# Patient Record
Sex: FEMALE | Race: WHITE | NOT HISPANIC OR LATINO | Employment: OTHER | URBAN - METROPOLITAN AREA
[De-identification: names, ages, dates, MRNs, and addresses within clinical notes are randomized per-mention and may not be internally consistent; named-entity substitution may affect disease eponyms.]

---

## 2023-08-28 PROBLEM — C20 RECTAL CANCER (MULTI): Status: ACTIVE | Noted: 2023-08-28

## 2023-08-28 PROBLEM — N95.1 MENOPAUSAL STATE: Status: ACTIVE | Noted: 2023-08-28

## 2023-08-28 PROBLEM — Z93.3 COLOSTOMY PRESENT (MULTI): Status: ACTIVE | Noted: 2023-08-28

## 2023-08-28 PROBLEM — H52.00 HYPEROPIA: Status: ACTIVE | Noted: 2023-08-28

## 2023-08-28 PROBLEM — N60.99 BENIGN MAMMARY DYSPLASIA: Status: ACTIVE | Noted: 2023-08-28

## 2023-08-28 PROBLEM — D72.819 LEUKOPENIA: Status: ACTIVE | Noted: 2023-08-28

## 2023-08-28 PROBLEM — M81.0 OSTEOPOROSIS: Status: ACTIVE | Noted: 2023-08-28

## 2023-08-28 PROBLEM — N60.99 ATYPICAL LOBULAR HYPERPLASIA OF BREAST: Status: ACTIVE | Noted: 2023-08-28

## 2023-08-28 PROBLEM — H25.10 NUCLEAR SCLEROSIS: Status: ACTIVE | Noted: 2023-08-28

## 2023-08-28 PROBLEM — M17.9 OSTEOARTHRITIS OF KNEE: Status: ACTIVE | Noted: 2023-08-28

## 2023-08-28 PROBLEM — R91.1 SOLITARY PULMONARY NODULE: Status: ACTIVE | Noted: 2023-08-28

## 2023-08-28 PROBLEM — N95.1 MENOPAUSAL SYMPTOM: Status: ACTIVE | Noted: 2023-08-28

## 2023-08-28 PROBLEM — R10.2 PERINEAL PAIN IN FEMALE: Status: ACTIVE | Noted: 2023-08-28

## 2023-08-28 PROBLEM — H61.23 BILATERAL IMPACTED CERUMEN: Status: ACTIVE | Noted: 2023-08-28

## 2023-08-28 PROBLEM — S32.409A ACETABULAR FRACTURE (MULTI): Status: ACTIVE | Noted: 2023-08-28

## 2023-08-28 PROBLEM — R92.0 MAMMOGRAPHIC MICROCALCIFICATION: Status: ACTIVE | Noted: 2023-08-28

## 2023-08-28 PROBLEM — K56.600 PARTIAL OBSTRUCTION OF SMALL INTESTINE (MULTI): Status: ACTIVE | Noted: 2023-08-28

## 2023-08-28 PROBLEM — H52.4 PRESBYOPIA OF BOTH EYES: Status: ACTIVE | Noted: 2023-08-28

## 2023-08-28 PROBLEM — K62.1 RECTAL POLYP: Status: ACTIVE | Noted: 2023-08-28

## 2023-08-28 PROBLEM — K21.9 GASTROESOPHAGEAL REFLUX DISEASE: Status: ACTIVE | Noted: 2023-08-28

## 2023-08-28 PROBLEM — E78.5 HYPERLIPIDEMIA: Status: ACTIVE | Noted: 2023-08-28

## 2023-08-28 PROBLEM — E55.9 VITAMIN D DEFICIENCY: Status: ACTIVE | Noted: 2023-08-28

## 2023-08-28 PROBLEM — N95.0 POSTMENOPAUSAL BLEEDING: Status: ACTIVE | Noted: 2023-08-28

## 2023-08-28 RX ORDER — ALENDRONATE SODIUM 70 MG/1
TABLET ORAL
COMMUNITY
Start: 2021-11-15 | End: 2023-11-02 | Stop reason: SDUPTHER

## 2023-08-28 RX ORDER — CHOLECALCIFEROL (VITAMIN D3) 25 MCG
1 TABLET ORAL DAILY
COMMUNITY

## 2023-08-28 RX ORDER — ACETAMINOPHEN 500 MG
2 TABLET ORAL DAILY
COMMUNITY

## 2023-08-28 RX ORDER — ACETAMINOPHEN 325 MG/1
TABLET ORAL EVERY 6 HOURS
COMMUNITY
Start: 2022-06-22

## 2023-08-28 RX ORDER — FAMOTIDINE 20 MG/1
2 TABLET, FILM COATED ORAL DAILY
COMMUNITY

## 2023-10-11 NOTE — PROGRESS NOTES
Subjective   Patient ID: Debby Quintanilla is a 73 y.o. female who presents for No chief complaint on file..  HPI  Patient was last in on 23 for a wound check, at the time she was no longer able to pack it and was advised to put dry gauze over it. She is here today for her 2 week wound check.     Past Medical History:   Diagnosis Date    Atypical lobular hyperplasia (ALH) of right breast     Bowel obstruction (CMS/HCC)     Colostomy status (CMS/HCC)     Colostomy in place    Endometriosis     GERD (gastroesophageal reflux disease)     Other specified health status     No known health problems    Personal history of other malignant neoplasm of large intestine     History of malignant neoplasm of colon    Rectal cancer (CMS/HCC)       Past Surgical History:   Procedure Laterality Date    ABDOMINAL PERINEAL BOWEL RESECTION W/ ILEOANAL POUCH  2016    Abdominal Perineal Resection    BI MAMMO GUIDED LOCALIZATION BREAST RIGHT Right 12/15/2016    BI MAMMO GUIDED LOCALIZATION BREAST RIGHT LAK SURG AIB LEGACY    BI STEREOTACTIC GUIDED BREAST LOCALIZATION AND BIOPSY RIGHT Right 2016    BI STEREOTACTIC GUIDED BREAST LOCALIZATION AND BIOPSY RIGHT LAK CLINICAL LEGACY     SECTION, CLASSIC  2014     Section    COLONOSCOPY  2005    COLONOSCOPY      DILATION AND CURETTAGE OF UTERUS      EXPLORATORY LAPAROTOMY  2023    Exploratory Laparoscopy - Repair of Small Bowel Enterotomy    FOOT SURGERY  2014    Foot Surgery    HIP SURGERY      Right Hip Surgery    KNEE ARTHROSCOPY W/ MENISCAL REPAIR  2014    Knee Arthroscopy With Lateral Meniscus Repair    REVISION / TAKEDOWN COLOSTOMY  2023    Takedown Splenic Flexure, Evacuation of a Large Serosanguineous Fluid Collection in Pelvis and 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      Family History   Problem Relation Name Age of Onset    Breast cancer Mother      Colon cancer Mother      Cancer Father      Other (sepsis)  Sister      Parkinsonism Brother      Diabetes Son        No Known Allergies   Review of Systems    Objective   Physical Exam  Incision clean dry and intact.  No further drainage.  Parastomal hernia intact.    Assessment/Plan   Problem List Items Addressed This Visit             ICD-10-CM    Non-healing surgical wound - Primary T81.89XA     Patient with resolution of open wound.  No further drainage.  Epithelialized nicely.  No signs of recurrent hernia.  Patient to follow-up as needed

## 2023-10-13 ENCOUNTER — OFFICE VISIT (OUTPATIENT)
Dept: SURGERY | Facility: CLINIC | Age: 73
End: 2023-10-13
Payer: MEDICARE

## 2023-10-13 VITALS
HEIGHT: 63 IN | BODY MASS INDEX: 19.84 KG/M2 | WEIGHT: 112 LBS | RESPIRATION RATE: 20 BRPM | SYSTOLIC BLOOD PRESSURE: 125 MMHG | TEMPERATURE: 99.3 F | DIASTOLIC BLOOD PRESSURE: 74 MMHG | OXYGEN SATURATION: 99 % | HEART RATE: 98 BPM

## 2023-10-13 DIAGNOSIS — T81.89XD NON-HEALING SURGICAL WOUND, SUBSEQUENT ENCOUNTER: Primary | ICD-10-CM

## 2023-10-13 PROBLEM — T81.89XA NON-HEALING SURGICAL WOUND: Status: ACTIVE | Noted: 2023-10-13

## 2023-10-13 PROCEDURE — 99024 POSTOP FOLLOW-UP VISIT: CPT | Performed by: SURGERY

## 2023-10-13 PROCEDURE — 1126F AMNT PAIN NOTED NONE PRSNT: CPT | Performed by: SURGERY

## 2023-10-13 PROCEDURE — 1159F MED LIST DOCD IN RCRD: CPT | Performed by: SURGERY

## 2023-10-13 PROCEDURE — 1036F TOBACCO NON-USER: CPT | Performed by: SURGERY

## 2023-10-13 ASSESSMENT — PAIN SCALES - GENERAL: PAINLEVEL: 0-NO PAIN

## 2023-10-13 NOTE — ASSESSMENT & PLAN NOTE
Patient with resolution of open wound.  No further drainage.  Epithelialized nicely.  No signs of recurrent hernia.  Patient to follow-up as needed

## 2023-10-31 ASSESSMENT — ENCOUNTER SYMPTOMS
APPETITE CHANGE: 0
CHILLS: 0
DIARRHEA: 0
HEADACHES: 0
SHORTNESS OF BREATH: 0
FEVER: 0
ABDOMINAL PAIN: 0
VOMITING: 0
COUGH: 0
NAUSEA: 0

## 2023-10-31 NOTE — PROGRESS NOTES
[facility]: [facility]  MEDICARE WELLNESS EXAM      Debby Quintanilla is a 73 y.o. female that is presenting today for Annual Exam (Medicare annual exam).    Assessment/Plan    Diagnoses and all orders for this visit:  Annual physical exam  History of rectal cancer  Gastroesophageal reflux disease without esophagitis  Hyperlipidemia, unspecified hyperlipidemia type  -     CBC; Future  -     Comprehensive Metabolic Panel; Future  -     Lipid Panel; Future  -     TSH with reflex to Free T4 if abnormal; Future  Osteoporosis, unspecified osteoporosis type, unspecified pathological fracture presence  -     alendronate (Fosamax) 70 mg tablet; Take 1 tablet (70 mg) by mouth every 7 days. 1 tablet 30 minutes before the first food, beverage or medicine of the day with plain water Orally Once per week  Vitamin D deficiency  -     Vitamin D 25-Hydroxy,Total (for eval of Vitamin D levels); Future  Leukopenia, unspecified type  Encounter for routine laboratory testing  -     CBC; Future  -     Comprehensive Metabolic Panel; Future  -     Lipid Panel; Future  -     TSH with reflex to Free T4 if abnormal; Future  -     Vitamin D 25-Hydroxy,Total (for eval of Vitamin D levels); Future  -     Hemoglobin A1C; Future  Mixed hyperlipidemia  -     Lipid Panel; Future  Hyperglycemia  -     Hemoglobin A1C; Future  Encounter for screening mammogram for breast cancer  -     BI mammo bilateral screening tomosynthesis; Future  Encounter for screening for osteoporosis  -     XR DEXA bone density; Future  Osteoporosis, idiopathic  -     XR DEXA bone density; Future  Other orders  -     Follow Up In Primary Care - Medicare Annual; Future  -     Pneumococcal conjugate vaccine, 20-valent (PREVNAR 20)    ADVANCED CARE PLANNING  Advanced Care Planning was discussed with patient:  The patient has an active advanced care plan on file. The patient has an active surrogate decision-maker on file.  The patient was encouraged to  ensure that any/all documentation is accurate and up to date, and that our office be provided a copy in the event that anything changes.    ACTIVITIES OF DAILY LIVING  Basic ADLs:  Bathing: Independent, Dressing: Independent, Toileting: Independent, Transferring: Independent, Continence: Independent, Feeding: Independent.    Instrumental ADLs:  Ability to use phone: Independent, Shopping: Independent, Cooking: Independent, House-keeping: Independent, Laundry: Independent, Transportation: Independent, Medication Management: Independent, Finance Management: Independent.    Subjective   This patient presents today for annual physical, Medicare wellness exam.  Discussed screening/prevention, healthy lifestyle and code status.   Reviewed the patient's wishing regarding decision making.  Had bowel obstruction this year.  Has been seeing Dr. Sellers after SBO, ex lap, hernia repair, nonhealing wound now much better.      Review of Systems   Constitutional:  Negative for appetite change, chills and fever.   Respiratory:  Negative for cough and shortness of breath.    Cardiovascular:  Negative for chest pain.   Gastrointestinal:  Negative for abdominal pain, diarrhea, nausea and vomiting.   Neurological:  Negative for headaches.   All other systems reviewed and are negative.    Objective   Vitals:    11/02/23 1035   BP: 125/70   Pulse: 96   SpO2: 99%      Body mass index is 19.74 kg/m².  Physical Exam  Vitals reviewed.   Constitutional:       General: She is not in acute distress.     Appearance: She is not toxic-appearing.   HENT:      Head: Normocephalic and atraumatic.      Mouth/Throat:      Mouth: Mucous membranes are moist.   Eyes:      Pupils: Pupils are equal, round, and reactive to light.   Cardiovascular:      Rate and Rhythm: Normal rate and regular rhythm.      Heart sounds: No murmur heard.  Pulmonary:      Breath sounds: Normal breath sounds. No wheezing, rhonchi or rales.   Abdominal:      General: There is  "no distension.      Palpations: Abdomen is soft.   Musculoskeletal:      Right lower leg: No edema.      Left lower leg: No edema.   Neurological:      General: No focal deficit present.      Mental Status: She is alert and oriented to person, place, and time.       Diagnostic Results   Lab Results   Component Value Date    GLUCOSE 101 (H) 09/16/2023    CALCIUM 9.1 09/16/2023     09/16/2023    K 5.0 09/16/2023    CO2 23 (L) 09/16/2023     09/16/2023    BUN 10 09/16/2023    CREATININE 0.6 09/16/2023     Lab Results   Component Value Date    ALT 20 09/16/2023    AST 39 09/16/2023    ALKPHOS 354 (H) 09/16/2023    BILITOT 0.3 09/16/2023     Lab Results   Component Value Date    WBC 8.7 09/16/2023    HGB 8.8 (L) 09/16/2023    HCT 27.8 (L) 09/16/2023    MCV 94.2 09/16/2023     (H) 09/16/2023     Lab Results   Component Value Date    CHOL 77 (L) 09/12/2023    CHOL 237 (H) 10/31/2022    CHOL 264 (H) 10/23/2021     Lab Results   Component Value Date    HDL 20 (L) 09/12/2023    HDL 64 10/31/2022    HDL 58 10/23/2021     Lab Results   Component Value Date    LDLCALC 38 (L) 09/12/2023    LDLCALC 154 (H) 10/31/2022    LDLCALC 188 (H) 10/23/2021     Lab Results   Component Value Date    TRIG 96 09/12/2023    TRIG 95 10/31/2022    TRIG 90 10/23/2021     No components found for: \"CHOLHDL\"  No results found for: \"HGBA1C\"  Other labs not included in the list above reviewed either before or during this encounter.    History   Past Medical History:   Diagnosis Date    Atypical lobular hyperplasia (ALH) of right breast     Bowel obstruction (CMS/HCC)     Colostomy status (CMS/HCC)     Colostomy in place    Endometriosis     GERD (gastroesophageal reflux disease)     Other specified health status     No known health problems    Personal history of other malignant neoplasm of large intestine     History of malignant neoplasm of colon    Rectal cancer (CMS/HCC) 2015     Past Surgical History:   Procedure Laterality Date "    ABDOMINAL PERINEAL BOWEL RESECTION W/ ILEOANAL POUCH      Abdominal Perineal Resection    BI MAMMO GUIDED LOCALIZATION BREAST RIGHT Right 12/15/2016    BI MAMMO GUIDED LOCALIZATION BREAST RIGHT LAK SURG AIB LEGACY    BI STEREOTACTIC GUIDED BREAST LOCALIZATION AND BIOPSY RIGHT Right 2016    BI STEREOTACTIC GUIDED BREAST LOCALIZATION AND BIOPSY RIGHT LAK CLINICAL LEGACY     SECTION, CLASSIC  2014     Section    COLONOSCOPY  2005    COLONOSCOPY      DILATION AND CURETTAGE OF UTERUS      EXPLORATORY LAPAROTOMY  2023    Exploratory Laparoscopy - Repair of Small Bowel Enterotomy    FOOT SURGERY  2014    Foot Surgery    HIP SURGERY      Right Hip Surgery    KNEE ARTHROSCOPY W/ MENISCAL REPAIR  2014    Knee Arthroscopy With Lateral Meniscus Repair    REVISION / TAKEDOWN COLOSTOMY  2023    Takedown Splenic Flexure, Evacuation of a Large Serosanguineous Fluid Collection in Pelvis and 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     Family History   Problem Relation Name Age of Onset    Breast cancer Mother      Colon cancer Mother      Cancer Father      Other (sepsis) Sister      Parkinsonism Brother      Diabetes Son       Social History     Socioeconomic History    Marital status:      Spouse name: Not on file    Number of children: Not on file    Years of education: Not on file    Highest education level: Not on file   Occupational History    Not on file   Tobacco Use    Smoking status: Never    Smokeless tobacco: Never   Substance and Sexual Activity    Alcohol use: Yes    Drug use: Never    Sexual activity: Defer   Other Topics Concern    Not on file   Social History Narrative    Not on file     Social Determinants of Health     Financial Resource Strain: Not on file   Food Insecurity: Not on file   Transportation Needs: Not on file   Physical Activity: Not on file   Stress: Not on file   Social Connections: Not on file   Intimate Partner Violence:  Not on file   Housing Stability: Not on file     No Known Allergies  Current Outpatient Medications on File Prior to Visit   Medication Sig Dispense Refill    calcium carbonate (Oscal) 500 mg calcium (1,250 mg) tablet Take 2 tablets (2,500 mg) by mouth once daily.      famotidine (Pepcid) 20 mg tablet Take 2 tablets (40 mg) by mouth once daily.      L. acidophilus/Bifid. animalis 15.5 billion cell capsule Take 1 capsule by mouth once daily.      MULTIVITAMIN ORAL Take by mouth once daily.      wheat dextrin (BENEFIBER HEALTHY SHAPE ORAL) Orally      [DISCONTINUED] alendronate (Fosamax) 70 mg tablet 1 tablet 30 minutes before the first food, beverage or medicine of the day with plain water Orally Once per week      acetaminophen (TylenoL) 325 mg tablet Take by mouth every 6 hours. TAKE 1 TO 2 TABLETS EVERY 6 HOURS AS NEEDED.      calcium carbonate-vitamin D3 600 mg-20 mcg (800 unit) tablet Take 2 tablets by mouth once daily.      CALCIUM ORAL Take 1 tablet by mouth once daily.      cholecalciferol (Vitamin D3) 25 MCG (1000 UT) tablet Take 1 tablet (25 mcg) by mouth once daily.      glucosamine/chondr che A sod (OSTEO BI-FLEX ORAL) Take 1 tablet by mouth once daily.      prochlorperazine maleate (COMPAZINE ORAL) Take 1 tablet by mouth if needed.       No current facility-administered medications on file prior to visit.     Immunization History   Administered Date(s) Administered    Pfizer COVID-19 vaccine, bivalent, age 12 years and older (30 mcg/0.3 mL) 10/05/2022    Pfizer Purple Cap SARS-CoV-2 03/24/2021, 04/23/2021, 11/05/2021    Pneumococcal conjugate vaccine, 20-valent (PREVNAR 20) 11/02/2023    Pneumococcal polysaccharide vaccine, 23-valent, age 2 years and older (PNEUMOVAX 23) 03/01/2017    Zoster vaccine, recombinant, adult (SHINGRIX) 10/23/2019, 12/19/2019     Patient's medical history was reviewed and updated either before or during this encounter.    He Isidro MD

## 2023-11-02 ENCOUNTER — OFFICE VISIT (OUTPATIENT)
Dept: PRIMARY CARE | Facility: CLINIC | Age: 73
End: 2023-11-02
Payer: MEDICARE

## 2023-11-02 VITALS
HEART RATE: 96 BPM | HEIGHT: 64 IN | WEIGHT: 115 LBS | OXYGEN SATURATION: 99 % | SYSTOLIC BLOOD PRESSURE: 125 MMHG | DIASTOLIC BLOOD PRESSURE: 70 MMHG | BODY MASS INDEX: 19.63 KG/M2

## 2023-11-02 DIAGNOSIS — D72.819 LEUKOPENIA, UNSPECIFIED TYPE: ICD-10-CM

## 2023-11-02 DIAGNOSIS — Z00.00 ANNUAL PHYSICAL EXAM: Primary | ICD-10-CM

## 2023-11-02 DIAGNOSIS — Z01.89 ENCOUNTER FOR ROUTINE LABORATORY TESTING: ICD-10-CM

## 2023-11-02 DIAGNOSIS — Z12.31 ENCOUNTER FOR SCREENING MAMMOGRAM FOR BREAST CANCER: ICD-10-CM

## 2023-11-02 DIAGNOSIS — E78.5 HYPERLIPIDEMIA, UNSPECIFIED HYPERLIPIDEMIA TYPE: ICD-10-CM

## 2023-11-02 DIAGNOSIS — E55.9 VITAMIN D DEFICIENCY: ICD-10-CM

## 2023-11-02 DIAGNOSIS — M81.0 OSTEOPOROSIS, UNSPECIFIED OSTEOPOROSIS TYPE, UNSPECIFIED PATHOLOGICAL FRACTURE PRESENCE: ICD-10-CM

## 2023-11-02 DIAGNOSIS — K21.9 GASTROESOPHAGEAL REFLUX DISEASE WITHOUT ESOPHAGITIS: ICD-10-CM

## 2023-11-02 DIAGNOSIS — Z85.048 HISTORY OF RECTAL CANCER: ICD-10-CM

## 2023-11-02 DIAGNOSIS — M81.8 OSTEOPOROSIS, IDIOPATHIC: ICD-10-CM

## 2023-11-02 DIAGNOSIS — E78.2 MIXED HYPERLIPIDEMIA: ICD-10-CM

## 2023-11-02 DIAGNOSIS — R73.9 HYPERGLYCEMIA: ICD-10-CM

## 2023-11-02 DIAGNOSIS — Z13.820 ENCOUNTER FOR SCREENING FOR OSTEOPOROSIS: ICD-10-CM

## 2023-11-02 PROCEDURE — G0009 ADMIN PNEUMOCOCCAL VACCINE: HCPCS | Performed by: INTERNAL MEDICINE

## 2023-11-02 PROCEDURE — 1036F TOBACCO NON-USER: CPT | Performed by: INTERNAL MEDICINE

## 2023-11-02 PROCEDURE — 1159F MED LIST DOCD IN RCRD: CPT | Performed by: INTERNAL MEDICINE

## 2023-11-02 PROCEDURE — 1126F AMNT PAIN NOTED NONE PRSNT: CPT | Performed by: INTERNAL MEDICINE

## 2023-11-02 PROCEDURE — G0439 PPPS, SUBSEQ VISIT: HCPCS | Performed by: INTERNAL MEDICINE

## 2023-11-02 RX ORDER — ALENDRONATE SODIUM 70 MG/1
70 TABLET ORAL
Qty: 12 TABLET | Refills: 3 | Status: SHIPPED | OUTPATIENT
Start: 2023-11-02 | End: 2024-11-01

## 2023-11-02 RX ORDER — CALCIUM CARBONATE 500(1250)
2 TABLET ORAL DAILY
COMMUNITY

## 2023-11-02 ASSESSMENT — ENCOUNTER SYMPTOMS
DEPRESSION: 0
LOSS OF SENSATION IN FEET: 0
OCCASIONAL FEELINGS OF UNSTEADINESS: 0

## 2023-11-02 ASSESSMENT — PATIENT HEALTH QUESTIONNAIRE - PHQ9
1. LITTLE INTEREST OR PLEASURE IN DOING THINGS: NOT AT ALL
SUM OF ALL RESPONSES TO PHQ9 QUESTIONS 1 AND 2: 0
2. FEELING DOWN, DEPRESSED OR HOPELESS: NOT AT ALL

## 2023-11-03 ENCOUNTER — LAB (OUTPATIENT)
Dept: LAB | Facility: LAB | Age: 73
End: 2023-11-03
Payer: MEDICARE

## 2023-11-03 DIAGNOSIS — E78.2 MIXED HYPERLIPIDEMIA: ICD-10-CM

## 2023-11-03 DIAGNOSIS — E78.5 HYPERLIPIDEMIA, UNSPECIFIED HYPERLIPIDEMIA TYPE: ICD-10-CM

## 2023-11-03 DIAGNOSIS — E55.9 VITAMIN D DEFICIENCY: ICD-10-CM

## 2023-11-03 DIAGNOSIS — Z01.89 ENCOUNTER FOR ROUTINE LABORATORY TESTING: ICD-10-CM

## 2023-11-03 DIAGNOSIS — R73.9 HYPERGLYCEMIA: ICD-10-CM

## 2023-11-03 LAB
25(OH)D3 SERPL-MCNC: 49 NG/ML (ref 31–100)
ALBUMIN SERPL-MCNC: 4 G/DL (ref 3.5–5)
ALP BLD-CCNC: 92 U/L (ref 35–125)
ALT SERPL-CCNC: 13 U/L (ref 5–40)
ANION GAP SERPL CALC-SCNC: 13 MMOL/L
AST SERPL-CCNC: 17 U/L (ref 5–40)
BILIRUB SERPL-MCNC: 0.4 MG/DL (ref 0.1–1.2)
BUN SERPL-MCNC: 18 MG/DL (ref 8–25)
CALCIUM SERPL-MCNC: 9.6 MG/DL (ref 8.5–10.4)
CHLORIDE SERPL-SCNC: 103 MMOL/L (ref 97–107)
CHOLEST SERPL-MCNC: 218 MG/DL (ref 133–200)
CHOLEST/HDLC SERPL: 4.1 {RATIO}
CO2 SERPL-SCNC: 26 MMOL/L (ref 24–31)
CREAT SERPL-MCNC: 0.7 MG/DL (ref 0.4–1.6)
EST. AVERAGE GLUCOSE BLD GHB EST-MCNC: 105 MG/DL
GFR SERPL CREATININE-BSD FRML MDRD: >90 ML/MIN/1.73M*2
GLUCOSE SERPL-MCNC: 107 MG/DL (ref 65–99)
HBA1C MFR BLD: 5.3 %
HDLC SERPL-MCNC: 53 MG/DL
LDLC SERPL CALC-MCNC: 140 MG/DL (ref 65–130)
POTASSIUM SERPL-SCNC: 4 MMOL/L (ref 3.4–5.1)
PROT SERPL-MCNC: 7.2 G/DL (ref 5.9–7.9)
SODIUM SERPL-SCNC: 142 MMOL/L (ref 133–145)
TRIGL SERPL-MCNC: 123 MG/DL (ref 40–150)
TSH SERPL DL<=0.05 MIU/L-ACNC: 1.19 MIU/L (ref 0.27–4.2)

## 2023-11-03 PROCEDURE — 82306 VITAMIN D 25 HYDROXY: CPT

## 2023-11-03 PROCEDURE — 80053 COMPREHEN METABOLIC PANEL: CPT

## 2023-11-03 PROCEDURE — 80061 LIPID PANEL: CPT

## 2023-11-03 PROCEDURE — 84443 ASSAY THYROID STIM HORMONE: CPT

## 2023-11-03 PROCEDURE — 83036 HEMOGLOBIN GLYCOSYLATED A1C: CPT

## 2023-11-03 PROCEDURE — 36415 COLL VENOUS BLD VENIPUNCTURE: CPT

## 2023-11-10 ENCOUNTER — ANCILLARY PROCEDURE (OUTPATIENT)
Dept: RADIOLOGY | Facility: CLINIC | Age: 73
End: 2023-11-10
Payer: MEDICARE

## 2023-11-10 VITALS — HEIGHT: 64 IN | BODY MASS INDEX: 19.63 KG/M2 | WEIGHT: 115 LBS

## 2023-11-10 DIAGNOSIS — Z13.820 ENCOUNTER FOR SCREENING FOR OSTEOPOROSIS: ICD-10-CM

## 2023-11-10 DIAGNOSIS — Z12.31 ENCOUNTER FOR SCREENING MAMMOGRAM FOR BREAST CANCER: ICD-10-CM

## 2023-11-10 DIAGNOSIS — M81.8 OSTEOPOROSIS, IDIOPATHIC: ICD-10-CM

## 2023-11-10 PROCEDURE — 77063 BREAST TOMOSYNTHESIS BI: CPT

## 2023-11-15 ENCOUNTER — ANCILLARY PROCEDURE (OUTPATIENT)
Dept: RADIOLOGY | Facility: CLINIC | Age: 73
End: 2023-11-15
Payer: MEDICARE

## 2023-11-15 DIAGNOSIS — Z13.820 ENCOUNTER FOR SCREENING FOR OSTEOPOROSIS: ICD-10-CM

## 2023-11-15 DIAGNOSIS — M81.8 OSTEOPOROSIS, IDIOPATHIC: ICD-10-CM

## 2023-11-15 PROCEDURE — 77085 DXA BONE DENSITY AXL VRT FX: CPT

## 2024-01-03 NOTE — PROGRESS NOTES
"MEDICARE ANNUAL  Subjective   Debby Quintanilla is a 73 y.o. female who is here for menopausal symptoms.    Complaints:  none  Periods: menopausal  Pain  none    HRT: no  History of abnormal Pap smear: no  History of abnormal mammogram: no        Review of Systems      Objective   /68   Ht 1.626 m (5' 4\")   Wt 52.6 kg (116 lb)   BMI 19.91 kg/m²        General:   Alert and oriented, in no acute distress   Neck: Supple. No visible thyromegaly.    Breast/Axilla: Normal to palpation bilaterally without masses, skin changes, or nipple discharge.    Abdomen: Soft, non-tender, without masses or organomegaly   Vulva: Normal architecture without erythema, masses, or lesions.    Vagina: Normal mucosa without lesions, masses, or atrophy. No abnormal vaginal discharge.    Cervix: Normal without masses, lesions, or signs of cervicitis.    Uterus: Normal mobile, non-enlarged uterus    Adnexa: Normal without masses or lesions   Pelvic Floor No POP noted. No high tone pelvic floor    Psych  Rectal Normal affect. Normal mood.   Normal stool, no masses, guaiac negative     Assessment/Plan   Diagnoses and all orders for this visit:  Screening for cancer of the rectum  Menopause      Mammogram-normal  DEXA-done    Tonja Johnson MD      "

## 2024-01-04 ENCOUNTER — OFFICE VISIT (OUTPATIENT)
Dept: OBSTETRICS AND GYNECOLOGY | Facility: CLINIC | Age: 74
End: 2024-01-04
Payer: MEDICARE

## 2024-01-04 VITALS
WEIGHT: 116 LBS | BODY MASS INDEX: 19.81 KG/M2 | HEIGHT: 64 IN | DIASTOLIC BLOOD PRESSURE: 68 MMHG | SYSTOLIC BLOOD PRESSURE: 148 MMHG

## 2024-01-04 DIAGNOSIS — Z78.0 MENOPAUSE: ICD-10-CM

## 2024-01-04 DIAGNOSIS — Z12.12 SCREENING FOR CANCER OF THE RECTUM: Primary | ICD-10-CM

## 2024-01-04 PROCEDURE — 1126F AMNT PAIN NOTED NONE PRSNT: CPT | Performed by: OBSTETRICS & GYNECOLOGY

## 2024-01-04 PROCEDURE — 99214 OFFICE O/P EST MOD 30 MIN: CPT | Performed by: OBSTETRICS & GYNECOLOGY

## 2024-01-04 PROCEDURE — 1036F TOBACCO NON-USER: CPT | Performed by: OBSTETRICS & GYNECOLOGY

## 2024-01-04 PROCEDURE — 1159F MED LIST DOCD IN RCRD: CPT | Performed by: OBSTETRICS & GYNECOLOGY

## 2024-01-04 ASSESSMENT — ENCOUNTER SYMPTOMS
LOSS OF SENSATION IN FEET: 0
OCCASIONAL FEELINGS OF UNSTEADINESS: 0
DEPRESSION: 0

## 2024-01-04 ASSESSMENT — LIFESTYLE VARIABLES
HOW OFTEN DO YOU HAVE A DRINK CONTAINING ALCOHOL: 2-4 TIMES A MONTH
HOW MANY STANDARD DRINKS CONTAINING ALCOHOL DO YOU HAVE ON A TYPICAL DAY: 1 OR 2

## 2024-01-04 ASSESSMENT — PATIENT HEALTH QUESTIONNAIRE - PHQ9
1. LITTLE INTEREST OR PLEASURE IN DOING THINGS: NOT AT ALL
2. FEELING DOWN, DEPRESSED OR HOPELESS: NOT AT ALL
SUM OF ALL RESPONSES TO PHQ9 QUESTIONS 1 & 2: 0

## 2024-10-30 ASSESSMENT — ENCOUNTER SYMPTOMS
COUGH: 0
CHILLS: 0
VOMITING: 0
DIARRHEA: 0
FEVER: 0
NAUSEA: 0
APPETITE CHANGE: 0
SHORTNESS OF BREATH: 0
ABDOMINAL PAIN: 0
HEADACHES: 0

## 2024-10-30 NOTE — PROGRESS NOTES
[facility]: [facility]  MEDICARE WELLNESS EXAM      Debby Quintanilla is a 74 y.o. female that is presenting today for medicare annual (Cpe as).    Assessment/Plan    Diagnoses and all orders for this visit:  Annual physical exam  History of rectal cancer  Hyperlipidemia, unspecified hyperlipidemia type  -     CBC and Auto Differential; Future  -     Comprehensive Metabolic Panel; Future  -     TSH with reflex to Free T4 if abnormal; Future  Gastroesophageal reflux disease without esophagitis  Mixed hyperlipidemia  -     Lipid Panel; Future  Hyperglycemia  -     Hemoglobin A1C; Future  Vitamin D deficiency  -     Vitamin D 25-Hydroxy,Total (for eval of Vitamin D levels); Future  Encounter for screening mammogram for breast cancer  -     BI mammo bilateral screening tomosynthesis; Future  Other orders  -     Follow Up In Primary Care - Medicare Annual  -     Tdap vaccine, age 7 years and older  (BOOSTRIX)  -     Follow Up In Primary Care - Medicare Annual; Future    ADVANCED CARE PLANNING  Advanced Care Planning was discussed with patient:  The patient has an active advanced care plan on file. The patient has an active surrogate decision-maker on file.  Encouraged the patient to confirm that Living Will and Healthcare Power of  (HCPoA) are accurate and up to date.  Encouraged the patient to confirm that our office be provided a copy of any documentation in the event that anything changes.    ACTIVITIES OF DAILY LIVING  Basic ADLs:  Bathing: Independent, Dressing: Independent, Toileting: Independent, Transferring: Independent, Continence: Independent, Feeding: Independent.    Instrumental ADLs:  Ability to use phone: Independent, Shopping: Independent, Cooking: Independent, House-keeping: Independent, Laundry: Independent, Transportation: Independent, Medication Management: Independent, Finance Management: Independent.    Subjective   HPI  This patient presents today for annual  physical, Medicare wellness exam.  Discussed screening/prevention, healthy lifestyle and code status.   Reviewed the patient's wishes regarding decision making.    Consultant visits and notes reviewed: Gyn Elizabeth, Surgery Anne Marie    Stable from a functional standpoint in regard to ADLs and IADLs.  No recent falls are reported.    The patient denies chest pain and shortness of breath.  No exertion-provoked or anginal-type symptoms are reported.    Review of Systems   Constitutional:  Negative for appetite change, chills and fever.   Respiratory:  Negative for cough and shortness of breath.    Cardiovascular:  Negative for chest pain.   Gastrointestinal:  Negative for abdominal pain, diarrhea, nausea and vomiting.   Neurological:  Negative for headaches.   All other systems reviewed and are negative.    Objective   Vitals:    11/04/24 1045   BP: 122/80   Pulse: 68   Temp: 36.2 °C (97.2 °F)   SpO2: 99%      Body mass index is 21.63 kg/m².  Physical Exam  Vitals reviewed.   Constitutional:       General: She is not in acute distress.     Appearance: She is not toxic-appearing.   HENT:      Head: Normocephalic and atraumatic.      Mouth/Throat:      Mouth: Mucous membranes are moist.   Eyes:      Pupils: Pupils are equal, round, and reactive to light.   Cardiovascular:      Rate and Rhythm: Normal rate and regular rhythm.      Heart sounds: No murmur heard.  Pulmonary:      Breath sounds: Normal breath sounds. No wheezing, rhonchi or rales.   Abdominal:      General: There is no distension.      Palpations: Abdomen is soft.   Musculoskeletal:      Right lower leg: No edema.      Left lower leg: No edema.   Neurological:      General: No focal deficit present.      Mental Status: She is alert and oriented to person, place, and time.       Diagnostic Results   Lab Results   Component Value Date    GLUCOSE 107 (H) 11/03/2023    CALCIUM 9.6 11/03/2023     11/03/2023    K 4.0 11/03/2023    CO2 26 11/03/2023      "2023    BUN 18 2023    CREATININE 0.70 2023     Lab Results   Component Value Date    ALT 13 2023    AST 17 2023    ALKPHOS 92 2023    BILITOT 0.4 2023     Lab Results   Component Value Date    WBC 8.7 2023    HGB 8.8 (L) 2023    HCT 27.8 (L) 2023    MCV 94.2 2023     (H) 2023     Lab Results   Component Value Date    CHOL 218 (H) 2023    CHOL 77 (L) 2023    CHOL 237 (H) 10/31/2022     Lab Results   Component Value Date    HDL 53.0 2023    HDL 20 (L) 2023    HDL 64 10/31/2022     Lab Results   Component Value Date    LDLCALC 140 (H) 2023    LDLCALC 38 (L) 2023    LDLCALC 154 (H) 10/31/2022     Lab Results   Component Value Date    TRIG 123 2023    TRIG 96 2023    TRIG 95 10/31/2022     No components found for: \"CHOLHDL\"  Lab Results   Component Value Date    HGBA1C 5.3 2023     Other labs not included in the list above reviewed either before or during this encounter.    History   Past Medical History:   Diagnosis Date    Atypical lobular hyperplasia (ALH) of right breast     Bowel obstruction (Multi)     Colostomy status (Multi)     Colostomy in place    Endometriosis     GERD (gastroesophageal reflux disease)     Other specified health status     No known health problems    Personal history of other malignant neoplasm of large intestine     History of malignant neoplasm of colon    Rectal cancer (Multi)      Past Surgical History:   Procedure Laterality Date    ABDOMINAL PERINEAL BOWEL RESECTION W/ ILEOANAL POUCH  2016    Abdominal Perineal Resection    BI MAMMO GUIDED BREAST RIGHT LOCALIZATION Right 12/15/2016    BI MAMMO GUIDED LOCALIZATION BREAST RIGHT LAK SURG AIB LEGACY    BI STEREOTACTIC GUIDED BREAST RIGHT LOCALIZATION AND BIOPSY Right 2016    BI STEREOTACTIC GUIDED BREAST LOCALIZATION AND BIOPSY RIGHT LAK CLINICAL LEGACY     SECTION, CLASSIC  2014    "  Section    COLONOSCOPY      COLONOSCOPY      DILATION AND CURETTAGE OF UTERUS      EXPLORATORY LAPAROTOMY  2023    Exploratory Laparoscopy - Repair of Small Bowel Enterotomy    FOOT SURGERY  2014    Foot Surgery    HIP SURGERY      Right Hip Surgery    KNEE ARTHROSCOPY W/ MENISCAL REPAIR  2014    Knee Arthroscopy With Lateral Meniscus Repair    REVISION / TAKEDOWN COLOSTOMY  2023    Takedown Splenic Flexure, Evacuation of a Large Serosanguineous Fluid Collection in Pelvis and 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     Family History   Problem Relation Name Age of Onset    Breast cancer Mother      Colon cancer Mother      Cancer Father      Other (sepsis) Sister      Parkinsonism Brother      Diabetes Son       Social History     Socioeconomic History    Marital status:      Spouse name: Not on file    Number of children: Not on file    Years of education: Not on file    Highest education level: Not on file   Occupational History    Not on file   Tobacco Use    Smoking status: Never    Smokeless tobacco: Never   Vaping Use    Vaping status: Never Used   Substance and Sexual Activity    Alcohol use: Yes    Drug use: Never    Sexual activity: Defer   Other Topics Concern    Not on file   Social History Narrative    Not on file     Social Drivers of Health     Financial Resource Strain: Not on file   Food Insecurity: Not on file   Transportation Needs: Not on file   Physical Activity: Not on file   Stress: Not on file   Social Connections: Not on file   Intimate Partner Violence: Not on file   Housing Stability: Not on file     No Known Allergies  Current Outpatient Medications on File Prior to Visit   Medication Sig Dispense Refill    acetaminophen (TylenoL) 325 mg tablet Take by mouth every 6 hours. TAKE 1 TO 2 TABLETS EVERY 6 HOURS AS NEEDED.      alendronate (Fosamax) 70 mg tablet Take 1 tablet (70 mg) by mouth every 7 days. 1 tablet 30 minutes before the  first food, beverage or medicine of the day with plain water Orally Once per week 12 tablet 3    calcium carbonate-vitamin D3 600 mg-20 mcg (800 unit) tablet Take 2 tablets by mouth once daily.      cholecalciferol (Vitamin D3) 25 MCG (1000 UT) tablet Take 1 tablet (25 mcg) by mouth once daily.      famotidine (Pepcid) 20 mg tablet Take 2 tablets (40 mg) by mouth once daily.      L. acidophilus/Bifid. animalis 15.5 billion cell capsule Take 1 capsule by mouth once daily.      MULTIVITAMIN ORAL Take by mouth once daily.      wheat dextrin (BENEFIBER HEALTHY SHAPE ORAL) Orally      [DISCONTINUED] calcium carbonate (Oscal) 500 mg calcium (1,250 mg) tablet Take 2 tablets (2,500 mg) by mouth once daily.      [DISCONTINUED] CALCIUM ORAL Take 1 tablet by mouth once daily.      [DISCONTINUED] glucosamine/chondr che A sod (OSTEO BI-FLEX ORAL) Take 1 tablet by mouth once daily.      [DISCONTINUED] prochlorperazine maleate (COMPAZINE ORAL) Take 1 tablet by mouth if needed.       No current facility-administered medications on file prior to visit.     Immunization History   Administered Date(s) Administered    Pfizer COVID-19 vaccine, 12 years and older, (30mcg/0.3mL) (Comirnaty) 11/15/2023, 10/03/2024    Pfizer COVID-19 vaccine, bivalent, age 12 years and older (30 mcg/0.3 mL) 10/05/2022    Pfizer Purple Cap SARS-CoV-2 03/24/2021, 04/23/2021, 11/05/2021    Pneumococcal conjugate vaccine, 20-valent (PREVNAR 20) 11/02/2023    Pneumococcal polysaccharide vaccine, 23-valent, age 2 years and older (PNEUMOVAX 23) 03/01/2017    Tdap vaccine, age 7 year and older (BOOSTRIX, ADACEL) 11/04/2024    Zoster vaccine, recombinant, adult (SHINGRIX) 10/23/2019, 12/19/2019     Patient's medical history was reviewed and updated either before or during this encounter.     He Isidro MD

## 2024-11-04 ENCOUNTER — OFFICE VISIT (OUTPATIENT)
Dept: PRIMARY CARE | Facility: CLINIC | Age: 74
End: 2024-11-04
Payer: MEDICARE

## 2024-11-04 VITALS
OXYGEN SATURATION: 99 % | HEIGHT: 64 IN | WEIGHT: 126 LBS | BODY MASS INDEX: 21.51 KG/M2 | SYSTOLIC BLOOD PRESSURE: 122 MMHG | TEMPERATURE: 97.2 F | DIASTOLIC BLOOD PRESSURE: 80 MMHG | HEART RATE: 68 BPM

## 2024-11-04 DIAGNOSIS — Z00.00 ANNUAL PHYSICAL EXAM: Primary | ICD-10-CM

## 2024-11-04 DIAGNOSIS — E78.2 MIXED HYPERLIPIDEMIA: ICD-10-CM

## 2024-11-04 DIAGNOSIS — E55.9 VITAMIN D DEFICIENCY: ICD-10-CM

## 2024-11-04 DIAGNOSIS — E78.5 HYPERLIPIDEMIA, UNSPECIFIED HYPERLIPIDEMIA TYPE: ICD-10-CM

## 2024-11-04 DIAGNOSIS — Z12.31 ENCOUNTER FOR SCREENING MAMMOGRAM FOR BREAST CANCER: ICD-10-CM

## 2024-11-04 DIAGNOSIS — R73.9 HYPERGLYCEMIA: ICD-10-CM

## 2024-11-04 DIAGNOSIS — K21.9 GASTROESOPHAGEAL REFLUX DISEASE WITHOUT ESOPHAGITIS: ICD-10-CM

## 2024-11-04 DIAGNOSIS — Z85.048 HISTORY OF RECTAL CANCER: ICD-10-CM

## 2024-11-04 PROCEDURE — 3008F BODY MASS INDEX DOCD: CPT | Performed by: INTERNAL MEDICINE

## 2024-11-04 PROCEDURE — G0439 PPPS, SUBSEQ VISIT: HCPCS | Performed by: INTERNAL MEDICINE

## 2024-11-04 PROCEDURE — 1126F AMNT PAIN NOTED NONE PRSNT: CPT | Performed by: INTERNAL MEDICINE

## 2024-11-04 PROCEDURE — 99215 OFFICE O/P EST HI 40 MIN: CPT | Mod: 25 | Performed by: INTERNAL MEDICINE

## 2024-11-04 PROCEDURE — 90715 TDAP VACCINE 7 YRS/> IM: CPT | Performed by: INTERNAL MEDICINE

## 2024-11-04 PROCEDURE — 1036F TOBACCO NON-USER: CPT | Performed by: INTERNAL MEDICINE

## 2024-11-04 PROCEDURE — 1159F MED LIST DOCD IN RCRD: CPT | Performed by: INTERNAL MEDICINE

## 2024-11-04 ASSESSMENT — ENCOUNTER SYMPTOMS
DEPRESSION: 0
OCCASIONAL FEELINGS OF UNSTEADINESS: 0
LOSS OF SENSATION IN FEET: 0

## 2024-11-04 ASSESSMENT — PATIENT HEALTH QUESTIONNAIRE - PHQ9
1. LITTLE INTEREST OR PLEASURE IN DOING THINGS: NOT AT ALL
2. FEELING DOWN, DEPRESSED OR HOPELESS: NOT AT ALL
SUM OF ALL RESPONSES TO PHQ9 QUESTIONS 1 AND 2: 0

## 2024-11-04 ASSESSMENT — PAIN SCALES - GENERAL: PAINLEVEL_OUTOF10: 0-NO PAIN

## 2024-11-07 ENCOUNTER — LAB (OUTPATIENT)
Dept: LAB | Facility: LAB | Age: 74
End: 2024-11-07
Payer: MEDICARE

## 2024-11-07 DIAGNOSIS — E78.2 MIXED HYPERLIPIDEMIA: ICD-10-CM

## 2024-11-07 DIAGNOSIS — E55.9 VITAMIN D DEFICIENCY: ICD-10-CM

## 2024-11-07 DIAGNOSIS — E78.5 HYPERLIPIDEMIA, UNSPECIFIED HYPERLIPIDEMIA TYPE: ICD-10-CM

## 2024-11-07 DIAGNOSIS — R73.9 HYPERGLYCEMIA: ICD-10-CM

## 2024-11-07 LAB
25(OH)D3 SERPL-MCNC: 36 NG/ML (ref 30–100)
ALBUMIN SERPL BCP-MCNC: 3.9 G/DL (ref 3.4–5)
ALP SERPL-CCNC: 54 U/L (ref 33–136)
ALT SERPL W P-5'-P-CCNC: 18 U/L (ref 7–45)
ANION GAP SERPL CALCULATED.3IONS-SCNC: 9 MMOL/L (ref 10–20)
AST SERPL W P-5'-P-CCNC: 18 U/L (ref 9–39)
BASOPHILS # BLD AUTO: 0.02 X10*3/UL (ref 0–0.1)
BASOPHILS NFR BLD AUTO: 0.9 %
BILIRUB SERPL-MCNC: 0.7 MG/DL (ref 0–1.2)
BUN SERPL-MCNC: 20 MG/DL (ref 6–23)
CALCIUM SERPL-MCNC: 9.5 MG/DL (ref 8.6–10.3)
CHLORIDE SERPL-SCNC: 105 MMOL/L (ref 98–107)
CHOLEST SERPL-MCNC: 259 MG/DL (ref 0–199)
CHOLEST/HDLC SERPL: 4.6 {RATIO}
CO2 SERPL-SCNC: 29 MMOL/L (ref 21–32)
CREAT SERPL-MCNC: 0.91 MG/DL (ref 0.5–1.05)
EGFRCR SERPLBLD CKD-EPI 2021: 67 ML/MIN/1.73M*2
EOSINOPHIL # BLD AUTO: 0.06 X10*3/UL (ref 0–0.4)
EOSINOPHIL NFR BLD AUTO: 2.8 %
ERYTHROCYTE [DISTWIDTH] IN BLOOD BY AUTOMATED COUNT: 13.7 % (ref 11.5–14.5)
EST. AVERAGE GLUCOSE BLD GHB EST-MCNC: 108 MG/DL
GLUCOSE SERPL-MCNC: 102 MG/DL (ref 74–99)
HBA1C MFR BLD: 5.4 %
HCT VFR BLD AUTO: 39.1 % (ref 36–46)
HDLC SERPL-MCNC: 55.7 MG/DL
HGB BLD-MCNC: 12.9 G/DL (ref 12–16)
IMM GRANULOCYTES # BLD AUTO: 0.01 X10*3/UL (ref 0–0.5)
IMM GRANULOCYTES NFR BLD AUTO: 0.5 % (ref 0–0.9)
LDLC SERPL CALC-MCNC: 179 MG/DL
LYMPHOCYTES # BLD AUTO: 0.75 X10*3/UL (ref 0.8–3)
LYMPHOCYTES NFR BLD AUTO: 35 %
MCH RBC QN AUTO: 30.6 PG (ref 26–34)
MCHC RBC AUTO-ENTMCNC: 33 G/DL (ref 32–36)
MCV RBC AUTO: 93 FL (ref 80–100)
MONOCYTES # BLD AUTO: 0.3 X10*3/UL (ref 0.05–0.8)
MONOCYTES NFR BLD AUTO: 14 %
NEUTROPHILS # BLD AUTO: 1 X10*3/UL (ref 1.6–5.5)
NEUTROPHILS NFR BLD AUTO: 46.8 %
NON HDL CHOLESTEROL: 203 MG/DL (ref 0–149)
NRBC BLD-RTO: 0 /100 WBCS (ref 0–0)
PLATELET # BLD AUTO: 235 X10*3/UL (ref 150–450)
POTASSIUM SERPL-SCNC: 4.2 MMOL/L (ref 3.5–5.3)
PROT SERPL-MCNC: 7.2 G/DL (ref 6.4–8.2)
RBC # BLD AUTO: 4.22 X10*6/UL (ref 4–5.2)
SODIUM SERPL-SCNC: 139 MMOL/L (ref 136–145)
TRIGL SERPL-MCNC: 123 MG/DL (ref 0–149)
TSH SERPL-ACNC: 0.95 MIU/L (ref 0.44–3.98)
VLDL: 25 MG/DL (ref 0–40)
WBC # BLD AUTO: 2.1 X10*3/UL (ref 4.4–11.3)

## 2024-11-07 PROCEDURE — 80061 LIPID PANEL: CPT

## 2024-11-07 PROCEDURE — 85025 COMPLETE CBC W/AUTO DIFF WBC: CPT

## 2024-11-07 PROCEDURE — 80053 COMPREHEN METABOLIC PANEL: CPT

## 2024-11-07 PROCEDURE — 84443 ASSAY THYROID STIM HORMONE: CPT

## 2024-11-07 PROCEDURE — 82306 VITAMIN D 25 HYDROXY: CPT

## 2024-11-07 PROCEDURE — 83036 HEMOGLOBIN GLYCOSYLATED A1C: CPT

## 2024-11-07 PROCEDURE — 36415 COLL VENOUS BLD VENIPUNCTURE: CPT

## 2024-11-08 DIAGNOSIS — D72.819 LEUKOPENIA, UNSPECIFIED TYPE: Primary | ICD-10-CM

## 2024-11-20 ENCOUNTER — HOSPITAL ENCOUNTER (OUTPATIENT)
Dept: RADIOLOGY | Facility: CLINIC | Age: 74
Discharge: HOME | End: 2024-11-20
Payer: MEDICARE

## 2024-11-20 VITALS — WEIGHT: 120 LBS | BODY MASS INDEX: 20.6 KG/M2

## 2024-11-20 DIAGNOSIS — Z12.31 ENCOUNTER FOR SCREENING MAMMOGRAM FOR BREAST CANCER: ICD-10-CM

## 2024-11-20 PROCEDURE — 77063 BREAST TOMOSYNTHESIS BI: CPT | Performed by: RADIOLOGY

## 2024-11-20 PROCEDURE — 77067 SCR MAMMO BI INCL CAD: CPT

## 2024-11-20 PROCEDURE — 77067 SCR MAMMO BI INCL CAD: CPT | Performed by: RADIOLOGY

## 2025-01-07 NOTE — PROGRESS NOTES
MEDICARE   Subjective   HPI:  Debby Quintanilla is a 74 y.o. female  No LMP recorded. Patient is postmenopausal. for menopausal complaints/routine exam.    Chief Complaint:   menopause  No current complaints  HRT:   no  Periods: menopausal  Pain:   no    GYNH:   Last pap    History of abnormal Pap smear:     Last mammogram   2024  History of abnormal mammogram:     DEXA  2023  Coloscopy   OB History          2    Para   2    Term   2            AB        Living   2         SAB        IAB        Ectopic        Multiple        Live Births                      Past Medical History:   Diagnosis Date    Atypical lobular hyperplasia (ALH) of right breast     Bowel obstruction (Multi)     Colostomy status (Multi)     Colostomy in place    Endometriosis     GERD (gastroesophageal reflux disease)     Other specified health status     No known health problems    Personal history of other malignant neoplasm of large intestine     History of malignant neoplasm of colon    Rectal cancer (Multi)        Past Surgical History:   Procedure Laterality Date    ABDOMINAL PERINEAL BOWEL RESECTION W/ ILEOANAL POUCH  2016    Abdominal Perineal Resection    BI MAMMO GUIDED BREAST RIGHT LOCALIZATION Right 12/15/2016    BI MAMMO GUIDED LOCALIZATION BREAST RIGHT LAK SURG [facility] LEGACY    BI STEREOTACTIC GUIDED BREAST RIGHT LOCALIZATION AND BIOPSY Right 2016    BI STEREOTACTIC GUIDED BREAST LOCALIZATION AND BIOPSY RIGHT LAK CLINICAL LEGACY     SECTION, CLASSIC  2014     Section    COLONOSCOPY  2005    COLONOSCOPY      DILATION AND CURETTAGE OF UTERUS      EXPLORATORY LAPAROTOMY  2023    Exploratory Laparoscopy - Repair of Small Bowel Enterotomy    FOOT SURGERY  2014    Foot Surgery    HIP SURGERY      Right Hip Surgery    KNEE ARTHROSCOPY W/ MENISCAL REPAIR  2014    Knee Arthroscopy With Lateral Meniscus Repair    REVISION / TAKEDOWN COLOSTOMY   "09/08/2023    Takedown Splenic Flexure, Evacuation of a Large Serosanguineous Fluid Collection in Pelvis and 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 Authorizing Provider   acetaminophen (TylenoL) 325 mg tablet Take by mouth every 6 hours. TAKE 1 TO 2 TABLETS EVERY 6 HOURS AS NEEDED. 6/22/22   Historical Provider, MD   alendronate (Fosamax) 70 mg tablet Take 1 tablet (70 mg) by mouth every 7 days. 1 tablet 30 minutes before the first food, beverage or medicine of the day with plain water Orally Once per week 11/2/23 11/4/24  He Isidro MD   calcium carbonate-vitamin D3 600 mg-20 mcg (800 unit) tablet Take 2 tablets by mouth once daily.    Historical Provider, MD   cholecalciferol (Vitamin D3) 25 MCG (1000 UT) tablet Take 1 tablet (25 mcg) by mouth once daily.    Historical Provider, MD   famotidine (Pepcid) 20 mg tablet Take 2 tablets (40 mg) by mouth once daily.    Historical Provider, MD   L. acidophilus/Bifid. animalis 15.5 billion cell capsule Take 1 capsule by mouth once daily.    Historical Provider, MD   MULTIVITAMIN ORAL Take by mouth once daily.    Historical Provider, MD   wheat dextrin (BENEFIBER HEALTHY SHAPE ORAL) Orally    Historical Provider, MD       Social History     Tobacco Use    Smoking status: Never    Smokeless tobacco: Never   Vaping Use    Vaping status: Never Used   Substance Use Topics    Alcohol use: Yes    Drug use: Never          Objective   /78   Ht 1.626 m (5' 4\")   Wt 57.5 kg (126 lb 12.8 oz)   BMI 21.77 kg/m²    General:   Alert and oriented, in no acute distress   Neck: Supple. No visible thyromegaly.    Breast/Axilla: Normal to palpation bilaterally without masses, skin changes, or nipple discharge.    Abdomen: Soft, non-tender, without masses or organomegaly   Vulva: Normal architecture without erythema, masses, or lesions.    Vagina: Normal mucosa without lesions, masses. Atrophy " present. No abnormal vaginal discharge.    Cervix: Normal without masses, lesions, or signs of cervicitis.    Uterus: Normal mobile, non-enlarged uterus    Adnexa: Normal without masses or lesions   Pelvic Floor No POP noted. No high tone pelvic floor    Psych  Rectal Normal affect. Normal mood.   Normal stool, no masses, guaiac negative     Assessment/Plan   Assessment & Plan  Menopause             OB/GYN Preventive:     - Pap smear indicated every 3-5 years if normal and otherwise low risk   - Self breast exam monthly and clinical breast examination/mammogram yearly   - Screening colonoscopy recommended starting age 45, then Q3-10 years depending on testing and family history   - Osteoporosis prevention discussion included vit D3/calcium supplements, weight-bearing exercise, DEXA 2023  - Genitourinary skin hygiene discussed.  - Diet/Weight management discussed.  - Exercise 30-60 minutes 3-5 times/day    oTnja Johnson MD

## 2025-01-09 ENCOUNTER — OFFICE VISIT (OUTPATIENT)
Dept: OBSTETRICS AND GYNECOLOGY | Facility: CLINIC | Age: 75
End: 2025-01-09
Payer: MEDICARE

## 2025-01-09 VITALS
WEIGHT: 126.8 LBS | DIASTOLIC BLOOD PRESSURE: 78 MMHG | BODY MASS INDEX: 21.65 KG/M2 | SYSTOLIC BLOOD PRESSURE: 132 MMHG | HEIGHT: 64 IN

## 2025-01-09 DIAGNOSIS — Z78.0 MENOPAUSE: Primary | ICD-10-CM

## 2025-01-09 PROCEDURE — 1160F RVW MEDS BY RX/DR IN RCRD: CPT | Performed by: OBSTETRICS & GYNECOLOGY

## 2025-01-09 PROCEDURE — 1036F TOBACCO NON-USER: CPT | Performed by: OBSTETRICS & GYNECOLOGY

## 2025-01-09 PROCEDURE — 99214 OFFICE O/P EST MOD 30 MIN: CPT | Performed by: OBSTETRICS & GYNECOLOGY

## 2025-01-09 PROCEDURE — 3008F BODY MASS INDEX DOCD: CPT | Performed by: OBSTETRICS & GYNECOLOGY

## 2025-01-09 PROCEDURE — 1126F AMNT PAIN NOTED NONE PRSNT: CPT | Performed by: OBSTETRICS & GYNECOLOGY

## 2025-01-09 PROCEDURE — 1159F MED LIST DOCD IN RCRD: CPT | Performed by: OBSTETRICS & GYNECOLOGY

## 2025-01-09 ASSESSMENT — LIFESTYLE VARIABLES
HOW OFTEN DO YOU HAVE SIX OR MORE DRINKS ON ONE OCCASION: NEVER
HOW MANY STANDARD DRINKS CONTAINING ALCOHOL DO YOU HAVE ON A TYPICAL DAY: 1 OR 2
AUDIT-C TOTAL SCORE: 1
SKIP TO QUESTIONS 9-10: 1
HOW OFTEN DO YOU HAVE A DRINK CONTAINING ALCOHOL: MONTHLY OR LESS

## 2025-01-09 ASSESSMENT — PATIENT HEALTH QUESTIONNAIRE - PHQ9
SUM OF ALL RESPONSES TO PHQ9 QUESTIONS 1 & 2: 0
2. FEELING DOWN, DEPRESSED OR HOPELESS: NOT AT ALL
1. LITTLE INTEREST OR PLEASURE IN DOING THINGS: NOT AT ALL

## 2025-01-09 ASSESSMENT — ENCOUNTER SYMPTOMS
LOSS OF SENSATION IN FEET: 0
OCCASIONAL FEELINGS OF UNSTEADINESS: 0
DEPRESSION: 0

## 2025-01-09 ASSESSMENT — PAIN SCALES - GENERAL: PAINLEVEL_OUTOF10: 0-NO PAIN

## 2025-07-04 ENCOUNTER — OFFICE VISIT (OUTPATIENT)
Dept: URGENT CARE | Age: 75
End: 2025-07-04
Payer: MEDICARE

## 2025-07-04 ENCOUNTER — ANCILLARY PROCEDURE (OUTPATIENT)
Dept: URGENT CARE | Age: 75
End: 2025-07-04
Payer: MEDICARE

## 2025-07-04 VITALS
TEMPERATURE: 98.9 F | SYSTOLIC BLOOD PRESSURE: 149 MMHG | OXYGEN SATURATION: 98 % | RESPIRATION RATE: 17 BRPM | BODY MASS INDEX: 21.46 KG/M2 | DIASTOLIC BLOOD PRESSURE: 79 MMHG | WEIGHT: 125 LBS | HEART RATE: 89 BPM

## 2025-07-04 DIAGNOSIS — R05.1 ACUTE COUGH: ICD-10-CM

## 2025-07-04 DIAGNOSIS — J18.9 PNEUMONIA OF RIGHT LOWER LOBE DUE TO INFECTIOUS ORGANISM: Primary | ICD-10-CM

## 2025-07-04 PROCEDURE — 1036F TOBACCO NON-USER: CPT | Performed by: PHYSICIAN ASSISTANT

## 2025-07-04 PROCEDURE — 99204 OFFICE O/P NEW MOD 45 MIN: CPT | Performed by: PHYSICIAN ASSISTANT

## 2025-07-04 PROCEDURE — 71046 X-RAY EXAM CHEST 2 VIEWS: CPT | Performed by: PHYSICIAN ASSISTANT

## 2025-07-04 PROCEDURE — 1159F MED LIST DOCD IN RCRD: CPT | Performed by: PHYSICIAN ASSISTANT

## 2025-07-04 RX ORDER — PREDNISONE 20 MG/1
TABLET ORAL
Qty: 10 TABLET | Refills: 0 | Status: SHIPPED | OUTPATIENT
Start: 2025-07-04

## 2025-07-04 RX ORDER — AMOXICILLIN AND CLAVULANATE POTASSIUM 875; 125 MG/1; MG/1
875 TABLET, FILM COATED ORAL 2 TIMES DAILY
Qty: 20 TABLET | Refills: 0 | Status: SHIPPED | OUTPATIENT
Start: 2025-07-04

## 2025-07-04 RX ORDER — ALBUTEROL SULFATE 90 UG/1
2 INHALANT RESPIRATORY (INHALATION) EVERY 6 HOURS PRN
Qty: 18 G | Refills: 0 | Status: SHIPPED | OUTPATIENT
Start: 2025-07-04 | End: 2026-07-04

## 2025-07-04 ASSESSMENT — ENCOUNTER SYMPTOMS
BACK PAIN: 1
COUGH: 1

## 2025-07-04 NOTE — PROGRESS NOTES
Subjective   Patient ID: Debby Quintanilla is a 75 y.o. female. They present today with a chief complaint of Cough and Back Pain (Pt states cough x 3 weeks. Now is having bilateral upper back pain).    History of Present Illness  Allergies: Reviewed.   Past medical history: Reviewed.   Past surgical history Reviewed.   Social history: Reviewed.    HPI: Patient is a very pleasant 75-year-old white female, past medical history of hyperlipidemia, presenting to the clinic for chief complaint of cough.  Patient is reporting approximately 3-week history of persistent cough that is productive of sputum.  She is unable to tell the color of her sputum as she does not frequently spit it out or look at it.  She denies any chest pain or shortness of breath.  She does report spasming coughing fits and states sometimes the cough is very spasmodic.  She denies any tobacco use or history of lung issues.  No fever or chills.  No abdominal pain, nausea, vomiting.  No numbness tingling or focal weakness.      Cough    Back Pain        Past Medical History  Allergies as of 07/04/2025    (No Known Allergies)       Prescriptions Prior to Admission[1]       Medical History[2]    Surgical History[3]     reports that she has never smoked. She has never used smokeless tobacco. She reports current alcohol use. She reports that she does not use drugs.    Review of Systems  Review of Systems   Respiratory:  Positive for cough.    Musculoskeletal:  Positive for back pain.                                  Objective    Vitals:    07/04/25 1007   BP: 149/79   BP Location: Left arm   Patient Position: Sitting   BP Cuff Size: Adult   Pulse: 89   Resp: 17   Temp: 37.2 °C (98.9 °F)   TempSrc: Oral   SpO2: 98%   Weight: 56.7 kg (125 lb)     No LMP recorded (lmp unknown). Patient is postmenopausal.    Physical Exam  Constitutional: Alert, oriented, cooperative, in no acute distress. Appears well nourished and well hydrated.    Head: Normocephalic,  atraumatic    Skin: Intact, dry skin. No lesions, rash, petechiae, or purpura.    Eyes: PERRL, EOMs intact, conjunctiva pink with no erythema or exudates. No scleral icterus. Eyelids without lesions.    ENT: Hearing grossly intact. No external deformities. Tympanic membranes intact with visible landmarks. Nares patent, mucus membranes moist. Dentition without lesions. Pharynx clear, uvula midline.    Neck: Trachea midline, no lymphadenopathy. No meningismus.     Pulmonary: Crackles are noted in the right lower lobe with no associated rhonchi or wheezing.  Lungs are otherwise clear to auscultation throughout with good chest wall excursion and good air exchange.  There is no accessory muscle use or stridor.     Cardiac: Regular rate and rhythm. Normal S1 and S2 with no murmur, rub, or gallop. No JVD, carotids without bruits. 2+ DP and PT pulses.     Abdomen: Soft, nontender, normoactive bowel sounds. No palpable organomegaly or mass. No rebound or guarding. No CVA tenderness.     Genitourinary: Exam deferred.    Musculoskeletal: Full range of motion in extremities. No pain, edema, or deformities. Peripheral pulses full and equal. No cyanosis, or clubbing.     Neurological: Cranial nerves II through XII are grossly intact. Normal sensation, no weakness, no focal findings identified.     Psychiatric: Appropriate mood and affect. Calm.  Procedures    Point of Care Test & Imaging Results from this visit    Imaging  No results found.    Cardiology, Vascular, and Other Imaging  No other imaging results found for the past 2 days      Diagnostic study results (if any) were reviewed by He Terry PA-C.    Assessment/Plan   Allergies, medications, history, and pertinent labs/EKGs/Imaging reviewed by He Terry PA-C.     Medical Decision Making  Patient was seen eval in the clinic for complaint of 3 weeks of a persistent cough.  On exam patient is nontoxic-appearing resting comfortably no acute distress.   Vital signs are stable, afebrile.  Heart is regular belly is soft and nontender.  I do appreciate some crackles in the right lung base with no associated wheezing or rhonchi.  Otherwise clear to auscultation throughout.  2 view chest x-ray was obtained which does not reveal any kin acute cardiopulmonary processes however I personally do appreciate some haziness in the right lung base and given the crackles noted on examination we did treat her for right lower lobe pneumonia with a 10-day course of Augmentin, albuterol Hailer, and 5 days of prednisone 40 mg daily.  Advise he follow-up with primary care physician next week.  Discharged home at this time.  Reviewed my impression, plan, strict return wrist report ED precautions with the patient.  She expresses understanding and agreement plan of care.      Orders and Diagnoses  Diagnoses and all orders for this visit:  Pneumonia of right lower lobe due to infectious organism  -     predniSONE (Deltasone) 20 mg tablet; Take two tablets per day for 5 days  -     amoxicillin-clavulanate (Augmentin) 875-125 mg tablet; Take 1 tablet by mouth 2 times a day.  -     albuterol 90 mcg/actuation inhaler; Inhale 2 puffs every 6 hours if needed for wheezing.  Acute cough  -     XR chest 2 views; Future        Medical Admin Record      Follow Up Instructions  No follow-ups on file.    Patient disposition: Home    Electronically signed by He Terry PA-C  10:35 AM         [1] (Not in a hospital admission)   [2]   Past Medical History:  Diagnosis Date    Atypical lobular hyperplasia (ALH) of right breast     Bowel obstruction (Multi)     Colostomy status (Multi)     Colostomy in place    Endometriosis     GERD (gastroesophageal reflux disease)     Other specified health status     No known health problems    Personal history of other malignant neoplasm of large intestine     History of malignant neoplasm of colon    Rectal cancer (Multi) 2015   [3]   Past Surgical  History:  Procedure Laterality Date    ABDOMINAL PERINEAL BOWEL RESECTION W/ ILEOANAL POUCH      Abdominal Perineal Resection    BI MAMMO GUIDED BREAST RIGHT LOCALIZATION Right 12/15/2016    BI MAMMO GUIDED LOCALIZATION BREAST RIGHT LAK SURG [facility]GAC[facility]    BI STEREOTACTIC GUIDED BREAST RIGHT LOCALIZATION AND BIOPSY Right 2016    BI STEREOTACTIC GUIDED BREAST LOCALIZATION AND BIOPSY RIGHT LAK CLINICAL LEGACY     SECTION, CLASSIC  2014     Section    COLONOSCOPY  2005    COLONOSCOPY      DILATION AND CURETTAGE OF UTERUS      EXPLORATORY LAPAROTOMY  2023    Exploratory Laparoscopy - Repair of Small Bowel Enterotomy    FOOT SURGERY  2014    Foot Surgery    HIP SURGERY      Right Hip Surgery    KNEE ARTHROSCOPY W/ MENISCAL REPAIR  2014    Knee Arthroscopy With Lateral Meniscus Repair    REVISION / TAKEDOWN COLOSTOMY  2023    Takedown Splenic Flexure, Evacuation of a Large Serosanguineous Fluid Collection in Pelvis and 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

## 2025-07-10 ENCOUNTER — ANCILLARY PROCEDURE (OUTPATIENT)
Dept: URGENT CARE | Age: 75
End: 2025-07-10
Payer: MEDICARE

## 2025-07-10 ENCOUNTER — OFFICE VISIT (OUTPATIENT)
Dept: URGENT CARE | Age: 75
End: 2025-07-10
Payer: MEDICARE

## 2025-07-10 VITALS
DIASTOLIC BLOOD PRESSURE: 80 MMHG | WEIGHT: 125 LBS | HEIGHT: 64 IN | RESPIRATION RATE: 16 BRPM | TEMPERATURE: 98.3 F | SYSTOLIC BLOOD PRESSURE: 169 MMHG | OXYGEN SATURATION: 99 % | BODY MASS INDEX: 21.34 KG/M2 | HEART RATE: 84 BPM

## 2025-07-10 DIAGNOSIS — R05.1 ACUTE COUGH: Primary | ICD-10-CM

## 2025-07-10 DIAGNOSIS — R05.1 ACUTE COUGH: ICD-10-CM

## 2025-07-10 PROCEDURE — 1125F AMNT PAIN NOTED PAIN PRSNT: CPT | Performed by: PHYSICIAN ASSISTANT

## 2025-07-10 PROCEDURE — 1036F TOBACCO NON-USER: CPT | Performed by: PHYSICIAN ASSISTANT

## 2025-07-10 PROCEDURE — 3008F BODY MASS INDEX DOCD: CPT | Performed by: PHYSICIAN ASSISTANT

## 2025-07-10 PROCEDURE — 1159F MED LIST DOCD IN RCRD: CPT | Performed by: PHYSICIAN ASSISTANT

## 2025-07-10 PROCEDURE — 71046 X-RAY EXAM CHEST 2 VIEWS: CPT | Performed by: PHYSICIAN ASSISTANT

## 2025-07-10 PROCEDURE — 99214 OFFICE O/P EST MOD 30 MIN: CPT | Performed by: PHYSICIAN ASSISTANT

## 2025-07-10 RX ORDER — METHYLPREDNISOLONE 4 MG/1
TABLET ORAL
Qty: 21 TABLET | Refills: 0 | Status: SHIPPED | OUTPATIENT
Start: 2025-07-10

## 2025-07-10 ASSESSMENT — ENCOUNTER SYMPTOMS
COUGH: 1
BACK PAIN: 1

## 2025-07-10 ASSESSMENT — PAIN SCALES - GENERAL: PAINLEVEL_OUTOF10: 8

## 2025-07-10 NOTE — PROGRESS NOTES
"Subjective   Patient ID: Debby Quintanilla is a 75 y.o. female. They present today with a chief complaint of Cough (Patient was diagnosed with pneumonia and states when she stopped the prednisone the cough and pain is coming back.) and Back Pain.    History of Present Illness  Allergies: Reviewed.   Past medical history: Reviewed.   Past surgical history Reviewed.   Social history: Reviewed.    HPI: Patient is a pleasant 36-year-old white female, past medical history of hyperlipidemia, presented to clinic with complaint of cough.  Patient was seen by the clinic 6 days ago diagnosed with pneumonia is currently on Augmentin albuterol inhaler and just finished her steroids.  She states after completion of her steroid she was feeling better however is reporting back to the clinic today stating her cough is worsening.  She is reporting that she is continuing her amoxicillin.  She is also reporting some left-sided mid back pain associated with her cough.  She denies any chest pain or shortness of breath.  No sputum production.  No fever or chills.  No upper respiratory congestion or rhinorrhea.      Cough    Back Pain        Past Medical History  Allergies as of 07/10/2025    (No Known Allergies)       Prescriptions Prior to Admission[1]       Medical History[2]    Surgical History[3]     reports that she has never smoked. She has never used smokeless tobacco. She reports current alcohol use. She reports that she does not use drugs.    Review of Systems  Review of Systems   Respiratory:  Positive for cough.    Musculoskeletal:  Positive for back pain.                                  Objective    Vitals:    07/10/25 0811   BP: 169/80   BP Location: Right arm   Patient Position: Sitting   Pulse: 84   Resp: 16   Temp: 36.8 °C (98.3 °F)   TempSrc: Oral   SpO2: 99%   Weight: 56.7 kg (125 lb)   Height: 1.626 m (5' 4\")     No LMP recorded (lmp unknown). Patient is postmenopausal.    Physical Exam  Constitutional: Alert, " oriented, cooperative, in no acute distress. Appears well nourished and well hydrated.    Head: Normocephalic, atraumatic    Skin: Intact, dry skin. No lesions, rash, petechiae, or purpura.    Eyes: PERRL, EOMs intact, conjunctiva pink with no erythema or exudates. No scleral icterus. Eyelids without lesions.    ENT: Hearing grossly intact. No external deformities. Tympanic membranes intact with visible landmarks. Nares patent, mucus membranes moist. Dentition without lesions. Pharynx clear, uvula midline.    Neck: Trachea midline, no lymphadenopathy. No meningismus.     Pulmonary: Lungs clear to auscultation bilaterally with good chest wall excursion. No rales, rhonchi, or wheezing. No accessory muscle use or stridor.     Cardiac: Regular rate and rhythm. Normal S1 and S2 with no murmur, rub, or gallop. No JVD, carotids without bruits. 2+ DP and PT pulses.     Abdomen: Soft, nontender, normoactive bowel sounds. No palpable organomegaly or mass. No rebound or guarding. No CVA tenderness.     Genitourinary: Exam deferred.    Musculoskeletal: Full range of motion in extremities. No pain, edema, or deformities. Peripheral pulses full and equal. No cyanosis, or clubbing.     Neurological: Cranial nerves II through XII are grossly intact. Normal sensation, no weakness, no focal findings identified.     Psychiatric: Appropriate mood and affect. Calm.  Procedures    Point of Care Test & Imaging Results from this visit    Imaging  No results found.    Cardiology, Vascular, and Other Imaging  No other imaging results found for the past 2 days      Diagnostic study results (if any) were reviewed by He Terry PA-C.    Assessment/Plan   Allergies, medications, history, and pertinent labs/EKGs/Imaging reviewed by He Terry PA-C.     Medical Decision Making  Patient presents to the clinic for chief complaint of persistent cough.  On exam patient is nontoxic well-appearing resting comfortably no acute  distress.  Vital signs are stable, afebrile.  Chest clear, heart is regular, belly is diffusely soft and nontender.  2 view chest x-ray was repeated today which does show improvement compared to chest x-ray 6 days ago.  Advised the patient to continue her Augmentin will place her on a Medrol Dosepak.  Advised albuterol inhaler as needed.  Advised Tylenol Profen as needed for her musculoskeletal back pain.  Advise she follow-up with her primary care physician in the next week.  Reviewed my impression, plan, strict return report ED precautions with the patient.  She expresses understanding and agreement plan of care.    Orders and Diagnoses  Diagnoses and all orders for this visit:  Acute cough  -     XR chest 2 views; Future  -     methylPREDNISolone (Medrol Dospak) 4 mg tablets; Take as directed on package.        Medical Admin Record      Follow Up Instructions  No follow-ups on file.    Patient disposition: Home    Electronically signed by He Terry PA-C  8:53 AM         [1] (Not in a hospital admission)   [2]   Past Medical History:  Diagnosis Date    Atypical lobular hyperplasia (ALH) of right breast     Bowel obstruction (Multi)     Colostomy status (Multi)     Colostomy in place    Endometriosis     GERD (gastroesophageal reflux disease)     Other specified health status     No known health problems    Personal history of other malignant neoplasm of large intestine     History of malignant neoplasm of colon    Rectal cancer (Multi)    [3]   Past Surgical History:  Procedure Laterality Date    ABDOMINAL PERINEAL BOWEL RESECTION W/ ILEOANAL POUCH  2016    Abdominal Perineal Resection    BI MAMMO GUIDED BREAST RIGHT LOCALIZATION Right 12/15/2016    BI MAMMO GUIDED LOCALIZATION BREAST RIGHT LAK SURG [facility] LEGACY    BI STEREOTACTIC GUIDED BREAST RIGHT LOCALIZATION AND BIOPSY Right 2016    BI STEREOTACTIC GUIDED BREAST LOCALIZATION AND BIOPSY RIGHT LAK CLINICAL LEGACY     SECTION, CLASSIC   2014     Section    COLONOSCOPY  2005    COLONOSCOPY      DILATION AND CURETTAGE OF UTERUS      EXPLORATORY LAPAROTOMY  2023    Exploratory Laparoscopy - Repair of Small Bowel Enterotomy    FOOT SURGERY  2014    Foot Surgery    HIP SURGERY      Right Hip Surgery    KNEE ARTHROSCOPY W/ MENISCAL REPAIR  2014    Knee Arthroscopy With Lateral Meniscus Repair    REVISION / TAKEDOWN COLOSTOMY  2023    Takedown Splenic Flexure, Evacuation of a Large Serosanguineous Fluid Collection in Pelvis and 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

## 2025-07-11 ENCOUNTER — TELEPHONE (OUTPATIENT)
Dept: URGENT CARE | Age: 75
End: 2025-07-11